# Patient Record
Sex: FEMALE | Race: WHITE | NOT HISPANIC OR LATINO | Employment: OTHER | URBAN - METROPOLITAN AREA
[De-identification: names, ages, dates, MRNs, and addresses within clinical notes are randomized per-mention and may not be internally consistent; named-entity substitution may affect disease eponyms.]

---

## 2019-03-21 ENCOUNTER — HISTORICAL (OUTPATIENT)
Dept: PREADMISSION TESTING | Facility: HOSPITAL | Age: 74
End: 2019-03-21

## 2021-01-27 ENCOUNTER — HISTORICAL (OUTPATIENT)
Dept: ADMINISTRATIVE | Facility: HOSPITAL | Age: 76
End: 2021-01-27

## 2021-05-06 ENCOUNTER — HISTORICAL (OUTPATIENT)
Dept: ADMINISTRATIVE | Facility: HOSPITAL | Age: 76
End: 2021-05-06

## 2022-03-07 ENCOUNTER — HISTORICAL (OUTPATIENT)
Dept: CARDIOLOGY | Facility: HOSPITAL | Age: 77
End: 2022-03-07

## 2022-03-29 ENCOUNTER — HISTORICAL (OUTPATIENT)
Dept: CARDIOLOGY | Facility: HOSPITAL | Age: 77
End: 2022-03-29

## 2022-03-29 LAB
INR PPP: 1.8 (ref 0–1.3)
PROTHROMBIN TIME: 20.3 S (ref 12.5–14.5)

## 2022-04-11 ENCOUNTER — HISTORICAL (OUTPATIENT)
Dept: CARDIOLOGY | Facility: HOSPITAL | Age: 77
End: 2022-04-11

## 2022-04-30 NOTE — OP NOTE
Patient:   Adrianna Mars            MRN: [id number]            FIN: [id number]               Age:   76 years     Sex:  Female     :  1945   Associated Diagnoses:   None   Author:   Major Duque MD      Preoperative diagnosis: Cataract of the  left eye     Postoperative diagnosis: Same     Operative procedure: Cataract extraction with intraocular lens implant    Lens Style: [id number]    The patient was brought to the operating theater by wheelchair and under light sedation given topical anesthesia using 0.75% Marcaine.  After adequate anesthesia the patient was then prepped and draped in usual ophthalmological manner.   Deacon lid speculums were applied and under the surgical microscope a keratome incision was made temporally using a stew keratome blade and a 15° stew keratome stab incision was made in the superior nasal quadrant.  Shugarcaine was used to enhance pupillary dilation.  Viscoat was instilled into the anterior chamber and an anterior capsulorrhexis was performed using a bent 25-gauge needle and the anterior capsule flap withdrawn with Kelman forceps. Using an irrigating Kelman cystotome the nucleus was irrigated and rocked free from the cortical bed and the handpiece was withdrawn. The phacoemulsification handpiece was introduced into the eye and phacoemulsification carried out the posterior chamber and the iris plane while a nucleus manipulator was used to direct the nucleus fragments  towards the phacoemulsification tip and prevent corneal contact. After phacoemulsification of the nucleus was completed the handpiece was withdrawn and an irrigation-aspiration handpiece was introduced into the eye and all visible cortical fragments were aspirated from the eye without difficulty. The handpiece was withdrawn and Healon was introduced into the eye to inflate the anterior chamber and the capsular bag.  An intraocular lens implant was selected, inspected, folded and placed into the  lens injector and then the lens carefully injected into the capsular bag while the haptics were positioned using the nucleus manipulator. The Healon was then aspirated from the eye using an irrigation-aspiration handpiece and the handpiece withdrawn from the eye. The anterior chamber was inflated with balanced salt solution and the wound checked for water tightness and found to be intact.  The antibiotic drops used preoperatively were instilled into the eye and the patient sent to the outpatient recovery in good condition.

## 2022-04-30 NOTE — OP NOTE
DATE OF SURGERY:    05/06/2021    SURGEON:  Ady Reed MD    PREOP DIAGNOSIS:    1. Visually significant cataract in the right eye.  2. Pupillary miosis.  3. High risk use of Flomax.    POSTOPERATIVE DIAGNOSES:    1. Visually significant cataract in the right eye.  2. Pupillary miosis.  3. High risk use of Flomax.    NAME OF PROCEDURE:    1. Phacoemulsification with implantation of posterior chamber intraocular lens, right eye.    2. Pupillary control by use of Malyugin ring.    ANESTHESIA:  Monitored anesthesia care with intracameral lidocaine.    COMPLICATIONS:  None.    BLOOD LOSS:  Nil.    PROCEDURE IN DETAIL:  After clinical examination and discussion of the risks, benefits, and alternatives, the patient was consented for cataract extraction in the right eye.  On the day of the operation, patient was brought to the operating room, and all appropriate anesthesia monitors were applied.  The eye was then prepped and draped in the usual sterile ophthalmic fashion.  Topical lidocaine was placed.  A paracentesis was created.  Intracameral lidocaine was placed.  Viscoelastic was placed into the anterior chamber.  The keratome was used to enter the eye.  A continuous curvilinear capsulorrhexis was placed into the anterior capsule using a cystome.  Hydrodissection of the lens nucleus was achieved with BSS on a cannula.  The phaco handpiece was introduced into the eye, and the cataract was removed by way of phacoemulsification.  The I and A handpiece was then introduced to the eye, and the remaining cortex was removed.  Viscoelastic was then used to reform the anterior chamber and capsular bag.  At this point, an intraocular lens model ZCB00 with a power of 24.0 diopters was removed from the package, inspected, and found to be in satisfactory condition.  This lens was then placed in the injector system and inserted into the eye.  Position was verified with the I and A handpiece.  The I and A handpiece was then  used to remove any remaining viscoelastic.     At this point, the wounds were hydrated with BSS on a cannula.  Intraocular pressure was approximated and normal.  The wounds were inspected and found to be watertight to Weck-Iraida and digital pressure.  At this point, the eye was given a final visual inspection, noted to have a well-placed PCIOL in good position within the capsular bag.  The wounds did appear to be watertight, and the intraocular pressure was approximated and normal.  At this point, the eye was liberally coated with antibiotics and shielded without being pressure patched.  The patient was then transported to the recovery room, having tolerated the procedure well.    ADDENDUM:  After entering the eye, a Malyugin ring was placed successfully to control the pupil size and allow for completion of the cataract surgery and the Malyugin ring was removed after implantation of the posterior chamber intraocular lens.  The case did proceed in the usual fashion after removal of the ring.        ______________________________  Ady Reed MD    JG/UD  DD:  05/07/2021  Time:  10:32AM  DT:  05/07/2021  Time:  10:43AM  Job #:  [id number]

## 2022-05-14 NOTE — OP NOTE
Patient:   Adrianna Mars            MRN: [id number]            FIN: [id number]               Age:   77 years     Sex:  Female     :  1945   Associated Diagnoses:   None   Author:   Moshe Ramos MD      Cardiologist: Dr. Moshe Ramos    Assistant: _    Preoperative Diagnosis(es):  [ X ] Atrial fibrillation  [ _ ] Artial flutter  [ _ ] Artial tachycardia    Postoperative Diagnosis(es):  [ X ] Normal Sinus rhythm  [ _ ] Sinus bradycardia  [ _ ] Artial fibrillation  [ _ ] Sinus with third-degree atrioventricular block  [ _ ] A-V sequential pacing/capture    Procedure(s):  Electrical cardioversion    Complications:  None    Description of Procedure:  After informed consent was obtained and permit signed, the patient was transported to the Cardiac Electrophysiology Laboratory. The EKG electrodes and [ _ ]cardioverter / [ _ ]defibrillator pads were applied to the patient. Pulse oximetry and ventilation were monitored, with oxygen and ventilation assistance given as needed. Adequate sedation for the procedure was accomplished by [X]the Anesthesia service/[_] IV Versed and Fentanyl. Synchronized direct-current energy was delivered at a level of 200 joules biphasic. Additional energy was [X] not indicated/[_] indicated at _ joules biphasic. Sinus rhythm[X]was/[_] was not restored. Upon awakening, the patient was transferred in a stable condition to a monitored bed for recovery.

## 2022-06-11 ENCOUNTER — HOSPITAL ENCOUNTER (EMERGENCY)
Facility: HOSPITAL | Age: 77
Discharge: HOME | End: 2022-06-11
Attending: EMERGENCY MEDICINE
Payer: MEDICARE

## 2022-06-11 VITALS
HEART RATE: 64 BPM | WEIGHT: 147 LBS | SYSTOLIC BLOOD PRESSURE: 141 MMHG | DIASTOLIC BLOOD PRESSURE: 72 MMHG | TEMPERATURE: 98 F | HEIGHT: 64 IN | RESPIRATION RATE: 14 BRPM | BODY MASS INDEX: 25.1 KG/M2 | OXYGEN SATURATION: 100 %

## 2022-06-11 DIAGNOSIS — R07.9 CHEST PAIN: ICD-10-CM

## 2022-06-11 DIAGNOSIS — R00.2 PALPITATIONS: Primary | ICD-10-CM

## 2022-06-11 LAB
ALBUMIN SERPL-MCNC: 3.7 GM/DL (ref 3.4–4.8)
ALBUMIN/GLOB SERPL: 1 RATIO (ref 1.1–2)
ALP SERPL-CCNC: 100 UNIT/L (ref 40–150)
ALT SERPL-CCNC: 18 UNIT/L (ref 0–55)
AST SERPL-CCNC: 26 UNIT/L (ref 5–34)
BASOPHILS # BLD AUTO: 0.01 X10(3)/MCL (ref 0–0.2)
BASOPHILS NFR BLD AUTO: 0.2 %
BILIRUBIN DIRECT+TOT PNL SERPL-MCNC: 0.7 MG/DL
BNP BLD-MCNC: 89.7 PG/ML
BUN SERPL-MCNC: 23 MG/DL (ref 9.8–20.1)
CALCIUM SERPL-MCNC: 9.4 MG/DL (ref 8.4–10.2)
CHLORIDE SERPL-SCNC: 108 MMOL/L (ref 98–107)
CO2 SERPL-SCNC: 22 MMOL/L (ref 23–31)
CREAT SERPL-MCNC: 0.87 MG/DL (ref 0.55–1.02)
EOSINOPHIL # BLD AUTO: 0.2 X10(3)/MCL (ref 0–0.9)
EOSINOPHIL NFR BLD AUTO: 3.2 %
ERYTHROCYTE [DISTWIDTH] IN BLOOD BY AUTOMATED COUNT: 15.3 % (ref 11.5–17)
GLOBULIN SER-MCNC: 3.6 GM/DL (ref 2.4–3.5)
GLUCOSE SERPL-MCNC: 110 MG/DL (ref 82–115)
HCT VFR BLD AUTO: 40.8 % (ref 37–47)
HGB BLD-MCNC: 13.6 GM/DL (ref 12–16)
LYMPHOCYTES # BLD AUTO: 1.61 X10(3)/MCL (ref 0.6–4.6)
LYMPHOCYTES NFR BLD AUTO: 26 %
MCH RBC QN AUTO: 31.4 PG (ref 27–31)
MCHC RBC AUTO-ENTMCNC: 33.3 MG/DL (ref 33–36)
MCV RBC AUTO: 94.2 FL (ref 80–94)
MONOCYTES # BLD AUTO: 0.47 X10(3)/MCL (ref 0.1–1.3)
MONOCYTES NFR BLD AUTO: 7.6 %
NEUTROPHILS # BLD AUTO: 3.9 X10(3)/MCL (ref 2.1–9.2)
NEUTROPHILS NFR BLD AUTO: 62.8 %
NRBC BLD AUTO-RTO: 0 %
PLATELET # BLD AUTO: 205 X10(3)/MCL (ref 130–400)
PMV BLD AUTO: 10.1 FL (ref 9.4–12.4)
POTASSIUM SERPL-SCNC: 4.2 MMOL/L (ref 3.5–5.1)
PROT SERPL-MCNC: 7.3 GM/DL (ref 5.8–7.6)
RBC # BLD AUTO: 4.33 X10(6)/MCL (ref 4.2–5.4)
SODIUM SERPL-SCNC: 137 MMOL/L (ref 136–145)
TROPONIN I SERPL-MCNC: <0.01 NG/ML (ref 0–0.04)
WBC # SPEC AUTO: 6.2 X10(3)/MCL (ref 4.5–11.5)

## 2022-06-11 PROCEDURE — 36000 PLACE NEEDLE IN VEIN: CPT

## 2022-06-11 PROCEDURE — 84484 ASSAY OF TROPONIN QUANT: CPT | Performed by: STUDENT IN AN ORGANIZED HEALTH CARE EDUCATION/TRAINING PROGRAM

## 2022-06-11 PROCEDURE — 93005 ELECTROCARDIOGRAM TRACING: CPT

## 2022-06-11 PROCEDURE — 80053 COMPREHEN METABOLIC PANEL: CPT | Performed by: STUDENT IN AN ORGANIZED HEALTH CARE EDUCATION/TRAINING PROGRAM

## 2022-06-11 PROCEDURE — 36415 COLL VENOUS BLD VENIPUNCTURE: CPT | Performed by: STUDENT IN AN ORGANIZED HEALTH CARE EDUCATION/TRAINING PROGRAM

## 2022-06-11 PROCEDURE — 99285 EMERGENCY DEPT VISIT HI MDM: CPT | Mod: 25

## 2022-06-11 PROCEDURE — 83880 ASSAY OF NATRIURETIC PEPTIDE: CPT | Performed by: STUDENT IN AN ORGANIZED HEALTH CARE EDUCATION/TRAINING PROGRAM

## 2022-06-11 PROCEDURE — 85025 COMPLETE CBC W/AUTO DIFF WBC: CPT | Performed by: STUDENT IN AN ORGANIZED HEALTH CARE EDUCATION/TRAINING PROGRAM

## 2022-06-11 NOTE — ED NOTES
Pt. C/o palpitations that started this morning with aching pain in chest. Reports recent cardioversionx2 for a-fib. EKG reads Normal Sinus rhythm with LBBB

## 2022-06-11 NOTE — ED PROVIDER NOTES
"Encounter Date: 6/11/2022       History     Chief Complaint   Patient presents with    Palpitations     Presents with chest burning and intermittent "fluttering" in her chest. Recent history of cardioversion x2.      HPI   76yo F presents to the ED complaining of palpitations that started this morning. Patient reports having a Hx of A. Fib with cardioversion x2 within the last 2 months. She also had recent angiogram with no blockage in 04/2022. This morning she reports feeling a "fluttering" in her chest when she woek up, was concerned because she has never felt symptoms of her A. Fib in the past. Denies n/v, SOB, diaphoresis, dizziness, lightheadedness, weakness, visual disturbances, or presyncopal/syncopal episodes. Came to the ED so that she may be evaluated.    Review of patient's allergies indicates:   Allergen Reactions    Metoprolol succinate     Penicillins     Rivaroxaban      No past medical history on file.  No past surgical history on file.  No family history on file.     Review of Systems   Constitutional: Negative for activity change, diaphoresis and fever.   HENT: Negative for tinnitus.    Eyes: Negative for visual disturbance.   Respiratory: Negative for chest tightness and shortness of breath.    Cardiovascular: Positive for palpitations. Negative for chest pain.   Gastrointestinal: Negative for abdominal pain, nausea and vomiting.   Musculoskeletal: Negative for myalgias.   Skin: Negative for pallor.   Neurological: Negative for dizziness, syncope, weakness and light-headedness.   All other systems reviewed and are negative.      Physical Exam     Initial Vitals [06/11/22 0623]   BP Pulse Resp Temp SpO2   (!) 154/94 72 18 97.5 °F (36.4 °C) 97 %      MAP       --         Physical Exam    Constitutional: She is not diaphoretic. No distress.   HENT:   Head: Normocephalic and atraumatic.   Mouth/Throat: Oropharynx is clear and moist.   Eyes: EOM are normal. Pupils are equal, round, and reactive to " light.   Neck: Neck supple. No thyromegaly present. No JVD present.   Cardiovascular: Normal rate, regular rhythm, normal heart sounds and intact distal pulses.   Pulmonary/Chest: Breath sounds normal. No respiratory distress. She exhibits no tenderness.   Abdominal: Abdomen is soft. Bowel sounds are normal. She exhibits no distension. There is no abdominal tenderness.   Musculoskeletal:      Cervical back: Neck supple.     Neurological: She is alert and oriented to person, place, and time. She has normal strength.   Skin: Skin is warm and dry.   Psychiatric: She has a normal mood and affect.         ED Course   Procedures  Labs Reviewed   COMPREHENSIVE METABOLIC PANEL - Abnormal; Notable for the following components:       Result Value    Chloride 108 (*)     Carbon Dioxide 22 (*)     Blood Urea Nitrogen 23.0 (*)     Globulin 3.6 (*)     Albumin/Globulin Ratio 1.0 (*)     All other components within normal limits   CBC WITH DIFFERENTIAL - Abnormal; Notable for the following components:    MCV 94.2 (*)     MCH 31.4 (*)     All other components within normal limits   TROPONIN I - Normal   B-TYPE NATRIURETIC PEPTIDE - Normal   CBC W/ AUTO DIFFERENTIAL    Narrative:     The following orders were created for panel order CBC auto differential.  Procedure                               Abnormality         Status                     ---------                               -----------         ------                     CBC with Differential[[id number]]        Abnormal            Final result                 Please view results for these tests on the individual orders.     EKG Readings: (Independently Interpreted)   Initial Reading: No STEMI.   Performed at 06:24, Normal sinus rhythm, Ventricular rate 68, Left axis deviation, LBBB     ECG Results          EKG 12-lead (In process)  Result time 06/11/22 06:44:53    In process by Interface, Lab In [facility] (06/11/22 06:44:53)                 Narrative:    Test Reason :  R07.9,    Vent. Rate : 068 BPM     Atrial Rate : 068 BPM     P-R Int : 204 ms          QRS Dur : 138 ms      QT Int : 426 ms       P-R-T Axes : 057 -59 111 degrees     QTc Int : 452 ms    Normal sinus rhythm  Left axis deviation  Left bundle branch block  Abnormal ECG  No previous ECGs available    Referred By:             Confirmed By:                             Imaging Results          X-Ray Chest AP Portable (Final result)  Result time 06/11/22 08:44:12    Final result by Thom Landry MD (06/11/22 08:44:12)                 Impression:      No acute chest disease is identified.      Electronically signed by: Thom Landry  Date:    06/11/2022  Time:    08:44             Narrative:    EXAMINATION:  XR CHEST AP PORTABLE    CLINICAL HISTORY:  Chest Pain;, .    COMPARISON:  March 21, 2019    FINDINGS:  No alveolar consolidation, effusion, or pneumothorax is seen.   The thoracic aorta is normal  cardiac silhouette, central pulmonary vessels and mediastinum are normal in size and are grossly unremarkable.   visualized osseous structures are grossly unremarkable.                              X-Rays:   Independently Interpreted Readings:   Chest X-Ray: CXR performed at 07:07, Airway patent, trachea midline, no obvious osseous abnormalities, cardiac silhouette not enlarged, no air under diaphragm,no costophrenic angle blunting, no opacifications noted, No acute cardiopulmonary process     Medications - No data to display  Medical Decision Making:   Initial Assessment:   78yo F with Hx of A. Fib and cardioversion x2 on Eliquis presents to the ED complaining of palpitations that began this morning. She denied any CP, SOB, diaphoresis, n/v, or weakness.   Differential Diagnosis:   A. Fib, Anemia, ACS  ED Management:  All labs within normal limits, EKG and cardiac monitor while in ED did not show A. Fib. Patient feels well and reports palpitations are not occurring as often and she feels like she would like to go  home. She reports having an appointment with cardiology on Wednesday 06/15/2022. Stable for discharge.            Attending Attestation:   Physician Attestation Statement for Resident:  As the supervising MD   Physician Attestation Statement: I have personally seen and examined this patient.    As the supervising MD I agree with the above PE.    As the supervising MD I agree with the above treatment, course, plan, and disposition.   -:     Attending Attestation:   Physician Attestation Statement for Resident:  As the supervising MD   Physician Attestation Statement: I have personally seen and examined this patient.   I agree with the above history. -:   As the supervising MD I agree with the above PE.    As the supervising MD I agree with the above treatment, course, plan, and disposition.   -: The resident and I discussed the patient's presenting complaints along with physical exam.  Differential diagnosis includes PVCs, PACs, intermittent AFib, atrial flutter, ventricular tachydysrhythmias, palpitations.  ECG shows the patient has sinus rhythm.  Will be observed in the emergency department on telemetry monitoring while awaiting routine laboratory tests to rule out anemia, electrolyte abnormalities as well.  Patient is comfortable without any vital sign instability.  Never with any complaints of chest pain or shortness of breath.  Plan will be discharge patient back to follow-up with her primary care physician/cardiologist if continues with no evidence of emergent dysrhythmias, lab abnormalities.  I have reviewed and agree with the residents interpretation of the following: lab data and EKG.              I have reviewed and agree with the residents interpretation of the following: lab data and EKG.                         Clinical Impression:   Final diagnoses:  [R07.9] Chest pain  [R00.2] Palpitations (Primary)                 Lawanda Burton MD  Resident  06/11/22 0908

## 2022-06-14 PROBLEM — E78.5 HLD (HYPERLIPIDEMIA): Chronic | Status: ACTIVE | Noted: 2022-06-14

## 2022-06-14 PROBLEM — I48.91 A-FIB: Status: ACTIVE | Noted: 2022-06-14

## 2022-06-14 PROBLEM — I50.32 CHRONIC HEART FAILURE WITH PRESERVED EJECTION FRACTION: Status: ACTIVE | Noted: 2022-06-14

## 2022-06-14 PROBLEM — I63.20 CEREBROVASCULAR ACCIDENT (CVA) DUE TO STENOSIS OF PRECEREBRAL ARTERY: Status: RESOLVED | Noted: 2022-06-14 | Resolved: 2022-06-14

## 2022-06-14 PROBLEM — H25.13 AGE-RELATED NUCLEAR CATARACT OF BOTH EYES: Status: ACTIVE | Noted: 2022-06-14

## 2022-06-14 PROBLEM — I10 HYPERTENSION: Chronic | Status: ACTIVE | Noted: 2022-06-14

## 2022-06-14 PROBLEM — I63.9 ISCHEMIC CEREBROVASCULAR ACCIDENT (CVA): Chronic | Status: ACTIVE | Noted: 2022-06-14

## 2022-06-14 PROBLEM — M19.90 ARTHRITIS: Status: ACTIVE | Noted: 2022-06-14

## 2022-06-14 PROBLEM — G47.33 OSA ON CPAP: Status: ACTIVE | Noted: 2022-03-15

## 2022-06-14 PROBLEM — R73.01 IFG (IMPAIRED FASTING GLUCOSE): Chronic | Status: ACTIVE | Noted: 2022-03-15

## 2022-06-14 PROBLEM — R73.01 IFG (IMPAIRED FASTING GLUCOSE): Status: ACTIVE | Noted: 2022-03-15

## 2022-06-14 PROBLEM — I63.20 CEREBROVASCULAR ACCIDENT (CVA) DUE TO STENOSIS OF PRECEREBRAL ARTERY: Status: ACTIVE | Noted: 2022-06-14

## 2022-06-14 PROBLEM — K57.90 DIVERTICULOSIS: Status: ACTIVE | Noted: 2022-06-14

## 2022-06-14 PROBLEM — I63.9 ISCHEMIC CEREBROVASCULAR ACCIDENT (CVA): Status: ACTIVE | Noted: 2022-06-14

## 2022-06-14 PROBLEM — M19.90 ARTHRITIS: Chronic | Status: ACTIVE | Noted: 2022-06-14

## 2022-06-14 PROBLEM — H25.13 AGE-RELATED NUCLEAR CATARACT OF BOTH EYES: Chronic | Status: ACTIVE | Noted: 2022-06-14

## 2022-06-14 PROBLEM — I50.32 CHRONIC HEART FAILURE WITH PRESERVED EJECTION FRACTION: Chronic | Status: ACTIVE | Noted: 2022-06-14

## 2022-06-14 PROBLEM — E78.5 HLD (HYPERLIPIDEMIA): Status: ACTIVE | Noted: 2022-06-14

## 2022-06-14 PROBLEM — G47.33 OSA ON CPAP: Chronic | Status: ACTIVE | Noted: 2022-03-15

## 2022-06-14 PROBLEM — C64.2: Chronic | Status: ACTIVE | Noted: 2022-03-15

## 2022-06-14 PROBLEM — H90.3 BILATERAL SENSORINEURAL HEARING LOSS: Chronic | Status: ACTIVE | Noted: 2022-06-14

## 2022-06-14 PROBLEM — I10 HYPERTENSION: Status: ACTIVE | Noted: 2022-06-14

## 2022-06-14 PROBLEM — C64.2: Status: ACTIVE | Noted: 2022-03-15

## 2022-06-14 PROBLEM — H90.3 BILATERAL SENSORINEURAL HEARING LOSS: Status: ACTIVE | Noted: 2022-06-14

## 2022-06-14 PROBLEM — R73.01 IFG (IMPAIRED FASTING GLUCOSE): Chronic | Status: RESOLVED | Noted: 2022-03-15 | Resolved: 2022-06-14

## 2022-06-14 PROBLEM — K57.90 DIVERTICULOSIS: Chronic | Status: ACTIVE | Noted: 2022-06-14

## 2022-06-14 PROBLEM — I48.91 A-FIB: Chronic | Status: ACTIVE | Noted: 2022-06-14

## 2022-07-28 ENCOUNTER — HOSPITAL ENCOUNTER (EMERGENCY)
Facility: HOSPITAL | Age: 77
Discharge: HOME | End: 2022-07-29
Attending: EMERGENCY MEDICINE
Payer: MEDICARE

## 2022-07-28 DIAGNOSIS — M62.838 MUSCLE SPASMS OF NECK: Primary | ICD-10-CM

## 2022-07-28 DIAGNOSIS — R53.1 WEAKNESS: ICD-10-CM

## 2022-07-28 PROCEDURE — 99284 EMERGENCY DEPT VISIT MOD MDM: CPT | Mod: 25

## 2022-07-28 PROCEDURE — 93005 ELECTROCARDIOGRAM TRACING: CPT

## 2022-07-28 PROCEDURE — 93010 EKG 12-LEAD: ICD-10-PCS | Mod: ,,, | Performed by: INTERNAL MEDICINE

## 2022-07-28 PROCEDURE — 93010 ELECTROCARDIOGRAM REPORT: CPT | Mod: ,,, | Performed by: INTERNAL MEDICINE

## 2022-07-29 ENCOUNTER — OFFICE VISIT (OUTPATIENT)
Dept: URGENT CARE | Facility: CLINIC | Age: 77
End: 2022-07-29
Payer: MEDICARE

## 2022-07-29 VITALS
HEIGHT: 64 IN | RESPIRATION RATE: 16 BRPM | DIASTOLIC BLOOD PRESSURE: 98 MMHG | TEMPERATURE: 99 F | HEART RATE: 62 BPM | BODY MASS INDEX: 25.95 KG/M2 | SYSTOLIC BLOOD PRESSURE: 139 MMHG | WEIGHT: 152 LBS | OXYGEN SATURATION: 96 %

## 2022-07-29 VITALS
OXYGEN SATURATION: 98 % | BODY MASS INDEX: 25.44 KG/M2 | WEIGHT: 149 LBS | SYSTOLIC BLOOD PRESSURE: 150 MMHG | TEMPERATURE: 98 F | RESPIRATION RATE: 20 BRPM | HEIGHT: 64 IN | DIASTOLIC BLOOD PRESSURE: 84 MMHG | HEART RATE: 60 BPM

## 2022-07-29 DIAGNOSIS — I80.9 PHLEBITIS AFTER INFUSION, INITIAL ENCOUNTER: ICD-10-CM

## 2022-07-29 DIAGNOSIS — T80.1XXA PHLEBITIS AFTER INFUSION, INITIAL ENCOUNTER: ICD-10-CM

## 2022-07-29 PROCEDURE — 1159F MED LIST DOCD IN RCRD: CPT | Mod: CPTII,,, | Performed by: NURSE PRACTITIONER

## 2022-07-29 PROCEDURE — 99202 OFFICE O/P NEW SF 15 MIN: CPT | Mod: ,,, | Performed by: NURSE PRACTITIONER

## 2022-07-29 PROCEDURE — 3079F DIAST BP 80-89 MM HG: CPT | Mod: CPTII,,, | Performed by: NURSE PRACTITIONER

## 2022-07-29 PROCEDURE — 3077F PR MOST RECENT SYSTOLIC BLOOD PRESSURE >= 140 MM HG: ICD-10-PCS | Mod: CPTII,,, | Performed by: NURSE PRACTITIONER

## 2022-07-29 PROCEDURE — 3079F PR MOST RECENT DIASTOLIC BLOOD PRESSURE 80-89 MM HG: ICD-10-PCS | Mod: CPTII,,, | Performed by: NURSE PRACTITIONER

## 2022-07-29 PROCEDURE — 3077F SYST BP >= 140 MM HG: CPT | Mod: CPTII,,, | Performed by: NURSE PRACTITIONER

## 2022-07-29 PROCEDURE — 96374 THER/PROPH/DIAG INJ IV PUSH: CPT

## 2022-07-29 PROCEDURE — 96375 TX/PRO/DX INJ NEW DRUG ADDON: CPT

## 2022-07-29 PROCEDURE — 1159F PR MEDICATION LIST DOCUMENTED IN MEDICAL RECORD: ICD-10-PCS | Mod: CPTII,,, | Performed by: NURSE PRACTITIONER

## 2022-07-29 PROCEDURE — 99202 PR OFFICE/OUTPT VISIT, NEW, LEVL II, 15-29 MIN: ICD-10-PCS | Mod: ,,, | Performed by: NURSE PRACTITIONER

## 2022-07-29 PROCEDURE — 63600175 PHARM REV CODE 636 W HCPCS: Performed by: EMERGENCY MEDICINE

## 2022-07-29 RX ORDER — ESOMEPRAZOLE MAGNESIUM 40 MG/1
40 CAPSULE, DELAYED RELEASE ORAL
COMMUNITY

## 2022-07-29 RX ORDER — DROPERIDOL 2.5 MG/ML
1.25 INJECTION, SOLUTION INTRAMUSCULAR; INTRAVENOUS EVERY 6 HOURS PRN
Status: DISCONTINUED | OUTPATIENT
Start: 2022-07-29 | End: 2022-07-29 | Stop reason: HOSPADM

## 2022-07-29 RX ORDER — METHYLPREDNISOLONE 4 MG/1
TABLET ORAL
Qty: 21 EACH | Refills: 0 | Status: SHIPPED | OUTPATIENT
Start: 2022-07-29 | End: 2022-08-17 | Stop reason: ALTCHOICE

## 2022-07-29 RX ORDER — ATORVASTATIN CALCIUM 40 MG/1
40 TABLET, FILM COATED ORAL DAILY
COMMUNITY
Start: 2022-06-22

## 2022-07-29 RX ORDER — SOTALOL HYDROCHLORIDE 120 MG/1
80 TABLET ORAL 2 TIMES DAILY
COMMUNITY
Start: 2022-06-27

## 2022-07-29 RX ORDER — APIXABAN 5 MG/1
5 TABLET, FILM COATED ORAL 2 TIMES DAILY
COMMUNITY
Start: 2022-07-12

## 2022-07-29 RX ORDER — EPINEPHRINE 0.22MG
200 AEROSOL WITH ADAPTER (ML) INHALATION DAILY
COMMUNITY

## 2022-07-29 RX ORDER — METHOCARBAMOL 500 MG/1
1000 TABLET, FILM COATED ORAL 3 TIMES DAILY
Qty: 30 TABLET | Refills: 0 | Status: SHIPPED | OUTPATIENT
Start: 2022-07-29 | End: 2022-08-03

## 2022-07-29 RX ORDER — DIPHENHYDRAMINE HYDROCHLORIDE 50 MG/ML
25 INJECTION INTRAMUSCULAR; INTRAVENOUS
Status: COMPLETED | OUTPATIENT
Start: 2022-07-29 | End: 2022-07-29

## 2022-07-29 RX ORDER — TAMSULOSIN HYDROCHLORIDE 0.4 MG/1
1 CAPSULE ORAL DAILY
COMMUNITY
Start: 2022-05-24

## 2022-07-29 RX ADMIN — DIPHENHYDRAMINE HYDROCHLORIDE 25 MG: 50 INJECTION, SOLUTION INTRAMUSCULAR; INTRAVENOUS at 12:07

## 2022-07-29 RX ADMIN — DROPERIDOL 1.25 MG: 2.5 INJECTION, SOLUTION INTRAMUSCULAR; INTRAVENOUS at 12:07

## 2022-07-29 NOTE — ED NOTES
Pt d/c'd home w/ , verbalized understanding of d/c instructions and meds prescribed, NAD noted, pt ambulated to front lobby w/ steady gait.

## 2022-07-29 NOTE — DISCHARGE INSTRUCTIONS
Advice after your visit:      Your visit in the emergency department is NOT definitive care - please follow-up with your primary care doctor and/or specialist within 1-2 days.  Please return if you have any worsening in your condition or if you have any other concerns.    If you had radiology exams like an XRAY or CT in the emergency Department the interpreation on them may be preliminary - there may be less time sensitive findings on the reports please obtain these reports within 24 hours from the hospital or by using your out on your mobile phone to access records.  Bring these to your primary care doctor and/or specialist for further review of incidental findings.    Please review any LAB WORK from your visit today with your primary care physician.    If you were prescribed OPIATE PAIN MEDICATION - please understand of these medications can be addictive, you may fill less of the prescription was written for, you do not have to take the full prescription.  You may discard what you do not use.  Please seek help if you feel you are having problems with addiction.  Do not drive or operate heavy machinery if you are taking sedating medications.  Do not mix these medications with alcohol.      If you had a SPLINT placed in the emergency department if you have severe pain numbness tingling or discoloration of year digits please remove the splint and return to the emergency department for further evaluation as this may represent a sign of compromise to the nerves or blood vessels due to swelling.    If you had SUTURES in the emergency department please have them removed in the prescribed time frame typically within 7-14 days.  You may shower but please do not bathe or swim.  Keep the wounds clean and dry and covered with a clean dressing.  Please return if he have any signs of infection like redness or drainage or pain at the suture site.    Please take the full course of  any ANTIBIOTICS you were prescribed - incomplete  courses of antibiotics can cause resistance to antibiotics in the future which will make it difficult to treat any infections you may have.

## 2022-07-29 NOTE — ED PROVIDER NOTES
Encounter Date: 7/28/2022    SCRIBE #1 NOTE: I, Ginna Arrington, am scribing for, and in the presence of,  Gigi Stephens MD. I have scribed the following portions of the note - Other sections scribed: HPI, ROS, PE.       History     Chief Complaint   Patient presents with    Neck Pain     Pt reports that she started having right sided neck pain at the base of her skull , that pain is now bilateral. Pain waxes and wanes. Pt reports that the pain got worse when laying down.  Pt had  a similar symptoms with reaction to xarelto, pt no longer takes xarelto. Pt has a hx of previous stroke (TPA'd) and afib.  No facial asymmetry , strength is equal bilaterally, pt is AAOx4.     HPI  Review of patient's allergies indicates:   Allergen Reactions    Metoprolol succinate     Penicillins     Rivaroxaban      Past Medical History:   Diagnosis Date    A-fib 6/14/2022    Age-related nuclear cataract of both eyes 6/14/2022    Arthritis 6/14/2022    Bilateral sensorineural hearing loss 6/14/2022    Cerebrovascular accident (CVA) due to stenosis of precerebral artery 6/14/2022    Chronic heart failure with preserved ejection fraction 6/14/2022    Diverticulosis 6/14/2022    HLD (hyperlipidemia) 6/14/2022    Hypertension 6/14/2022    Ischemic cerebrovascular accident (CVA) 6/14/2022    Kidney carcinoma, left 3/15/2022    CADY on CPAP 3/15/2022     No past surgical history on file.  No family history on file.     Review of Systems    Physical Exam     Initial Vitals [07/28/22 2258]   BP Pulse Resp Temp SpO2   (!) 168/92 66 18 99.3 °F (37.4 °C) 96 %      MAP       --         Physical Exam    Nursing note and vitals reviewed.  Constitutional: She appears well-developed and well-nourished. No distress.   HENT:   Head: Normocephalic and atraumatic.   Mouth/Throat: Oropharynx is clear and moist.   Eyes: Conjunctivae are normal. Pupils are equal, round, and reactive to light.   Neck: Neck supple.   Normal range of  motion.  Cardiovascular: Normal rate, regular rhythm and normal heart sounds.   No murmur heard.  Pulmonary/Chest: Breath sounds normal. No respiratory distress.   Abdominal: Abdomen is soft. She exhibits no distension. There is no abdominal tenderness.   Musculoskeletal:         General: Normal range of motion.      Cervical back: Normal range of motion and neck supple.     Neurological: She is alert and oriented to person, place, and time. GCS score is 15. GCS eye subscore is 4. GCS verbal subscore is 5. GCS motor subscore is 6.   Skin: Skin is warm and dry. No rash noted.   Psychiatric: Her mood appears anxious.         ED Course   Procedures  Labs Reviewed - No data to display       Imaging Results    None          Medications   droperidoL injection 1.25 mg (1.25 mg Intravenous Given 7/29/22 0054)   diphenhydrAMINE injection 25 mg (25 mg Intravenous Given 7/29/22 0045)              Scribe Attestation:   Scribe #1: I performed the above scribed service and the documentation accurately describes the services I performed. I attest to the accuracy of the note.    Attending Attestation:           Physician Attestation for Scribe:  Physician Attestation Statement for Scribe #1: I, Gigi Stephens MD, reviewed documentation, as scribed by Ginna Arrington in my presence, and it is both accurate and complete.                      Clinical Impression:   Final diagnoses:  [R53.1] Weakness  [M62.838] Muscle spasms of neck (Primary)          ED Disposition Condition    Discharge Stable        ED Prescriptions     Medication Sig Dispense Start Date End Date Auth. Provider    methylPREDNISolone (MEDROL DOSEPACK) 4 mg tablet use as directed 21 each 7/29/2022 8/19/2022 Gigi Stephens MD    methocarbamoL (ROBAXIN) 500 MG Tab Take 2 tablets (1,000 mg total) by mouth 3 (three) times daily. for 5 days 30 tablet 7/29/2022 8/3/2022 Gigi Stephens MD        Follow-up Information     Follow up With Specialties Details Why  Contact Info    Norm Lucio MD Internal Medicine   [address]  [facility] [address]  [phone]             Gigi Stephens MD  07/29/22 0738

## 2022-07-29 NOTE — PROGRESS NOTES
"Subjective:       Patient ID: Adrianna Mars is a 77 y.o. female.    Vitals:  height is 5' 4" (1.626 m) and weight is 67.6 kg (149 lb). Her oral temperature is 98 °F (36.7 °C). Her blood pressure is 150/84 (abnormal) and her pulse is 60. Her respiration is 20 and oxygen saturation is 98%.     Chief Complaint: Follow-up (In ED last night, IV site bleeding overnight. Pt concerned due to being on Eliquis. )    77-year-old female presents with dried blood underneath Tegaderm dressing from an IV site several hours ago from the ER patient currently on Eliquis concerned about possible phlebitis    Follow-up        Skin: Positive for bruising (after skin cleansed no swelling or bruising). Negative for erythema.       Objective:      Physical Exam   Constitutional: She is oriented to person, place, and time. She appears well-developed. She is cooperative.  Non-toxic appearance. She does not appear ill. No distress.   HENT:   Head: Normocephalic and atraumatic.   Ears:   Right Ear: Hearing, tympanic membrane, external ear and ear canal normal.   Left Ear: Hearing, tympanic membrane, external ear and ear canal normal.   Nose: Nose normal. No mucosal edema, rhinorrhea or nasal deformity. No epistaxis. Right sinus exhibits no maxillary sinus tenderness and no frontal sinus tenderness. Left sinus exhibits no maxillary sinus tenderness and no frontal sinus tenderness.   Mouth/Throat: Uvula is midline, oropharynx is clear and moist and mucous membranes are normal. No trismus in the jaw. Normal dentition. No uvula swelling. No posterior oropharyngeal erythema.   Eyes: Conjunctivae and lids are normal. Right eye exhibits no discharge. Left eye exhibits no discharge. No scleral icterus.   Neck: Trachea normal and phonation normal. Neck supple.   Cardiovascular: Normal rate, regular rhythm, normal heart sounds and normal pulses.   Pulmonary/Chest: Effort normal and breath sounds normal. No respiratory distress.   Abdominal: Normal " appearance and bowel sounds are normal. She exhibits no distension and no mass. Soft. There is no abdominal tenderness.   Musculoskeletal: Normal range of motion.         General: No deformity. Normal range of motion.   Neurological: She is alert and oriented to person, place, and time. She exhibits normal muscle tone. Coordination normal.   Skin: Skin is warm, dry, intact, not diaphoretic and not pale. bruising (no swelling to left forearm) No erythema   Psychiatric: Her speech is normal and behavior is normal. Judgment and thought content normal.   Nursing note and vitals reviewed.        Assessment:       1. Phlebitis after infusion, initial encounter          Plan:     apply coolness to IV site if swelling noted.  Follow-up with your primary care provider or return here for concerns    Phlebitis after infusion, initial encounter

## 2022-07-29 NOTE — PATIENT INSTRUCTIONS
Apply coolness to IV site as needed for swelling or bruising  Return here or follow-up with your primary care provider for concerns

## 2022-08-05 PROBLEM — T80.1XXA PHLEBITIS AFTER INFUSION: Status: RESOLVED | Noted: 2022-07-29 | Resolved: 2022-08-05

## 2022-08-05 PROBLEM — I80.9 PHLEBITIS AFTER INFUSION: Status: RESOLVED | Noted: 2022-07-29 | Resolved: 2022-08-05

## 2022-08-05 PROBLEM — M54.2 CERVICALGIA: Status: ACTIVE | Noted: 2022-08-05

## 2022-09-14 NOTE — PROCEDURES
Patient:   Adrianna Mars            MRN: [id number]            FIN: [id number]               Age:   77 years     Sex:  Female     :  1945   Associated Diagnoses:   None   Author:   Moshe Ramos MD      Cardiologist: Dr. Moshe Ramos    Assistant: _    Preoperative Diagnosis(es):  [ X ] Atrial fibrillation  [ _ ] Artial flutter  [ _ ] Artial tachycardia    Postoperative Diagnosis(es):  [ X ] Normal Sinus rhythm  [ _ ] Sinus bradycardia  [ _ ] Artial fibrillation  [ _ ] Sinus with third-degree atrioventricular block  [ _ ] A-V sequential pacing/capture    Procedure(s):  Electrical cardioversion    Complications:  None    Description of Procedure:  After informed consent was obtained and permit signed, the patient was transported to the Cardiac Electrophysiology Laboratory. The EKG electrodes and [ _ ]cardioverter / [ _ ]defibrillator pads were applied to the patient. Pulse oximetry and ventilation were monitored, with oxygen and ventilation assistance given as needed. Adequate sedation for the procedure was accomplished by [X]the Anesthesia service/[_] IV Versed and Fentanyl. Synchronized direct-current energy was delivered at a level of 200 joules biphasic. Additional energy was [X] not indicated/[_] indicated at _ joules biphasic. Sinus rhythm[X]was/[_] was not restored. Upon awakening, the patient was transferred in a stable condition to a monitored bed for recovery.

## 2022-09-26 ENCOUNTER — APPOINTMENT (OUTPATIENT)
Dept: RADIOLOGY | Facility: HOSPITAL | Age: 77
End: 2022-09-26
Attending: INTERNAL MEDICINE
Payer: MEDICARE

## 2022-09-26 DIAGNOSIS — M54.2 CERVICALGIA: ICD-10-CM

## 2022-09-26 PROCEDURE — 72141 MRI NECK SPINE W/O DYE: CPT | Mod: TC

## 2023-01-31 PROBLEM — M54.2 CERVICALGIA: Chronic | Status: ACTIVE | Noted: 2022-08-05

## 2023-04-11 ENCOUNTER — TELEPHONE (OUTPATIENT)
Dept: NEUROSURGERY | Facility: CLINIC | Age: 78
End: 2023-04-11
Payer: MEDICARE

## 2023-04-11 NOTE — TELEPHONE ENCOUNTER
----- Message from Kathleen Ferreira PA-C sent at 4/11/2023  2:51 PM CDT -----  Regarding: needs appointment  This is Aneta Torres's mother.  Ami contacted me asking we see her.  Please have her come in to see me relatively soon.  Add her on to a day that is not bad.  She needs cervical x-rays with flex/ext views.  The x-rays sent over from [facility] are lumbar.  Maybe check to see if they have cervical.

## 2023-04-14 ENCOUNTER — TELEPHONE (OUTPATIENT)
Dept: NEUROSURGERY | Facility: CLINIC | Age: 78
End: 2023-04-14
Payer: MEDICARE

## 2023-04-14 NOTE — TELEPHONE ENCOUNTER
----- Message from Laurence Olmedo MA sent at 4/11/2023  4:37 PM CDT -----  Regarding: NEEDS APPT  [facility] Neurosurgery Clinical Support Staff  This is Aneta Torres's mother.  Ami contacted me asking we see her.  Please have her come in to see me relatively soon.  Add her on to a day that is not bad.  She needs cervical x-rays with flex/ext views.  The x-rays sent over from [facility] are lumbar.  Maybe check to see if they have cervical.

## 2023-04-21 ENCOUNTER — TELEPHONE (OUTPATIENT)
Dept: NEUROSURGERY | Facility: CLINIC | Age: 78
End: 2023-04-21
Payer: MEDICARE

## 2023-04-21 NOTE — TELEPHONE ENCOUNTER
Setting reminder to myself to call patient in regards to Kathleen's recommendation and advice on 04/24 around 9:00am when clinic resumes.

## 2023-04-21 NOTE — TELEPHONE ENCOUNTER
Her main complaints are of HA (temples) and LBP.    Early last year, she experienced an adverse reaction to a medication.  She started with HA at that time.  The HA can get as bad as 8-9/10.  She was told the HA is coming from her neck.      She also complains of LBP (6-7/10 at worse) that is likely mechanical in nature.  She does not have leg symptoms through the day.  She reports restless leg symptoms at night.  I reviewed her cervical MRI and lumbar x-rays.  We need cervical x-rays, lumbar flex/ext, and lumbar MRI.  She wants to go to [facility] [place] [place] imaging.  Have her come in to see me on Thursday May 4.

## 2023-04-24 ENCOUNTER — TELEPHONE (OUTPATIENT)
Dept: NEUROSURGERY | Facility: CLINIC | Age: 78
End: 2023-04-24
Payer: MEDICARE

## 2023-04-24 DIAGNOSIS — M54.2 CERVICALGIA: Chronic | ICD-10-CM

## 2023-04-24 DIAGNOSIS — M54.16 LUMBAR RADICULOPATHY: Primary | ICD-10-CM

## 2023-04-24 NOTE — TELEPHONE ENCOUNTER
No, it's fine, we don't need one. You can just schedule her per Kathleen as a NP on the date she asked with the XR.

## 2023-04-24 NOTE — TELEPHONE ENCOUNTER
I opened up the 05/04 slot at 1:00. Can you please schedule this patient as Kathleen suggested? She will be a new patient.

## 2023-04-24 NOTE — TELEPHONE ENCOUNTER
----- Message from Laurence Olmedo MA sent at 4/21/2023  1:00 PM CDT -----  Regarding: NEEDS APPT  Her main complaints are of HA (temples) and LBP.    Early last year, she experienced an adverse reaction to a medication.  She started with HA at that time.  The HA can get as bad as 8-9/10.  She was told the HA is coming from her neck.       She also complains of LBP (6-7/10 at worse) that is likely mechanical in nature.  She does not have leg symptoms through the day.  She reports restless leg symptoms at night.  I reviewed her cervical MRI and lumbar x-rays.  We need cervical x-rays, lumbar flex/ext, and lumbar MRI.  She [place] [facility] [place].  Have her come in to see me on Thursday May 4.

## 2023-04-24 NOTE — TELEPHONE ENCOUNTER
Per tyron, we will not have time to get MRI authorized so proceed with xrays. I called patient, advised of Kathleen's recommendations. Scheduled her for 5/4/23 at 1:00. I did advise patient that I faxed orders for xray to [facility] imaging and they will be giving her a call to schedule. She verbalized understanding.

## 2023-05-04 ENCOUNTER — OFFICE VISIT (OUTPATIENT)
Dept: NEUROSURGERY | Facility: CLINIC | Age: 78
End: 2023-05-04
Payer: MEDICARE

## 2023-05-04 VITALS
WEIGHT: 161 LBS | HEART RATE: 60 BPM | HEIGHT: 64 IN | DIASTOLIC BLOOD PRESSURE: 94 MMHG | RESPIRATION RATE: 16 BRPM | SYSTOLIC BLOOD PRESSURE: 166 MMHG | BODY MASS INDEX: 27.49 KG/M2

## 2023-05-04 DIAGNOSIS — M41.57 SCOLIOSIS OF LUMBOSACRAL REGION DUE TO DEGENERATIVE DISEASE OF SPINE IN ADULT: ICD-10-CM

## 2023-05-04 DIAGNOSIS — M51.34 DDD (DEGENERATIVE DISC DISEASE), THORACIC: ICD-10-CM

## 2023-05-04 DIAGNOSIS — M47.22 CERVICAL SPONDYLOSIS WITH RADICULOPATHY: Primary | ICD-10-CM

## 2023-05-04 DIAGNOSIS — M51.36 LUMBAR DEGENERATIVE DISC DISEASE: ICD-10-CM

## 2023-05-04 DIAGNOSIS — R51.9 NONINTRACTABLE HEADACHE, UNSPECIFIED CHRONICITY PATTERN, UNSPECIFIED HEADACHE TYPE: ICD-10-CM

## 2023-05-04 PROCEDURE — 1101F PT FALLS ASSESS-DOCD LE1/YR: CPT | Mod: CPTII,,, | Performed by: PHYSICIAN ASSISTANT

## 2023-05-04 PROCEDURE — 1160F RVW MEDS BY RX/DR IN RCRD: CPT | Mod: CPTII,,, | Performed by: PHYSICIAN ASSISTANT

## 2023-05-04 PROCEDURE — 3288F FALL RISK ASSESSMENT DOCD: CPT | Mod: CPTII,,, | Performed by: PHYSICIAN ASSISTANT

## 2023-05-04 PROCEDURE — 1126F PR PAIN SEVERITY QUANTIFIED, NO PAIN PRESENT: ICD-10-PCS | Mod: CPTII,,, | Performed by: PHYSICIAN ASSISTANT

## 2023-05-04 PROCEDURE — 3080F DIAST BP >= 90 MM HG: CPT | Mod: CPTII,,, | Performed by: PHYSICIAN ASSISTANT

## 2023-05-04 PROCEDURE — 99204 OFFICE O/P NEW MOD 45 MIN: CPT | Mod: ,,, | Performed by: PHYSICIAN ASSISTANT

## 2023-05-04 PROCEDURE — 3288F PR FALLS RISK ASSESSMENT DOCUMENTED: ICD-10-PCS | Mod: CPTII,,, | Performed by: PHYSICIAN ASSISTANT

## 2023-05-04 PROCEDURE — 1126F AMNT PAIN NOTED NONE PRSNT: CPT | Mod: CPTII,,, | Performed by: PHYSICIAN ASSISTANT

## 2023-05-04 PROCEDURE — 1159F MED LIST DOCD IN RCRD: CPT | Mod: CPTII,,, | Performed by: PHYSICIAN ASSISTANT

## 2023-05-04 PROCEDURE — 1160F PR REVIEW ALL MEDS BY PRESCRIBER/CLIN PHARMACIST DOCUMENTED: ICD-10-PCS | Mod: CPTII,,, | Performed by: PHYSICIAN ASSISTANT

## 2023-05-04 PROCEDURE — 99204 PR OFFICE/OUTPT VISIT, NEW, LEVL IV, 45-59 MIN: ICD-10-PCS | Mod: ,,, | Performed by: PHYSICIAN ASSISTANT

## 2023-05-04 PROCEDURE — 1101F PR PT FALLS ASSESS DOC 0-1 FALLS W/OUT INJ PAST YR: ICD-10-PCS | Mod: CPTII,,, | Performed by: PHYSICIAN ASSISTANT

## 2023-05-04 PROCEDURE — 3080F PR MOST RECENT DIASTOLIC BLOOD PRESSURE >= 90 MM HG: ICD-10-PCS | Mod: CPTII,,, | Performed by: PHYSICIAN ASSISTANT

## 2023-05-04 PROCEDURE — 3077F SYST BP >= 140 MM HG: CPT | Mod: CPTII,,, | Performed by: PHYSICIAN ASSISTANT

## 2023-05-04 PROCEDURE — 3077F PR MOST RECENT SYSTOLIC BLOOD PRESSURE >= 140 MM HG: ICD-10-PCS | Mod: CPTII,,, | Performed by: PHYSICIAN ASSISTANT

## 2023-05-04 PROCEDURE — 1159F PR MEDICATION LIST DOCUMENTED IN MEDICAL RECORD: ICD-10-PCS | Mod: CPTII,,, | Performed by: PHYSICIAN ASSISTANT

## 2023-05-04 RX ORDER — OMEGA-3-ACID ETHYL ESTERS 1 G/1
2 CAPSULE, LIQUID FILLED ORAL DAILY
COMMUNITY
End: 2023-08-17

## 2023-05-04 NOTE — PROGRESS NOTES
[facility]  History & Physical  Neurosurgery      Adrianna Mars   [id number]   1945       SUBJECTIVE:     CHIEF COMPLAINT:  Headaches, mid and lower back pain      HPI:  Adrianna Mars is a 78 y.o. female who presents for neurosurgical evaluation.  The patient began with headaches, neck pain, and pain into the trapezius muscles 1.5 years ago.  Around the same time, she was switched to Xarelto.  Currently, the headaches occur 2 to 3 times a month.  The duration is 1-3 days.  She also complains of neck pain with pain into the interscapular region, midthoracic region and lower back.  The pain is mechanical in nature.  With sitting after activity, she feels fatigued at the right anterior and lateral thigh.  She describes burning at bilateral ankles.  She denies arm or leg pain, numbness, or tingling.  Subjectively she has weakness in her arms.  She does have a history of shoulder surgery bilaterally and right hip replacement.  Activity increases her pain.  Sitting and resting helps reduce her pain.      Her main complaint is of mid and lower back pain.  The pain occurs every day.  Her headaches are a problem when they occur.  Conservative measures have included chiropractic care for her neck in the past.  She did not like that treatment.  She is receiving massage therapy now which has provided short-term relief.  She describes urinary leakage and constipation.  She feels her balance is good.      Past Medical History:   Diagnosis Date    A-fib 06/14/2022    Age-related nuclear cataract of both eyes 06/14/2022    Arthritis 06/14/2022    Bilateral sensorineural hearing loss 06/14/2022    Cerebrovascular accident (CVA) due to stenosis of precerebral artery 06/14/2022    Cervicalgia 08/05/2022    Chronic heart failure with preserved ejection fraction 06/14/2022    Diverticulosis 06/14/2022    HLD (hyperlipidemia) 06/14/2022    Hypertension 06/14/2022    Ischemic cerebrovascular accident (CVA)  "06/14/2022    Kidney carcinoma, left 03/15/2022    Lumbar radiculopathy     CADY on CPAP 03/15/2022       Past Surgical History:   Procedure Laterality Date    APPENDECTOMY      CARDIOVERSION      HIP REPLACEMENT ARTHROPLASTY Right 2017    HYSTERECTOMY      KIDNEY SURGERY Left 2015    excision of tumor.    SHOULDER SURGERY Bilateral 2013       Family History   Problem Relation Age of Onset    Stroke Mother     Prostate cancer Father     Cancer Brother        Social History     Socioeconomic History    Marital status:    Tobacco Use    Smoking status: Never    Smokeless tobacco: Never        Review of patient's allergies indicates:   Allergen Reactions    Metoprolol succinate     Penicillins     Rivaroxaban         Current Outpatient Medications   Medication Instructions    atorvastatin (LIPITOR) 40 mg, Oral, Daily    calcium carbonate/vitamin D3 (CALTRATE 600 + D ORAL) Oral, Daily    cholecalciferol (vitamin D3) (VITAMIN D3) 1,000 Units, Oral, Daily    coenzyme Q10 200 mg, Oral, Daily    diclofenac sodium (VOLTAREN) 2 g, Topical (Top), 4 times daily    ELIQUIS 5 mg, Oral, 2 times daily    esomeprazole (NEXIUM) 40 mg, Oral    felodipine (PLENDIL) 10 mg, Oral, BP > 160/90    felodipine (PLENDIL) 5 mg, Oral, Nightly    fluticasone propionate (FLONASE) 50 mcg, Each Nostril, Daily    multivitamin with folic acid 400 mcg Tab 1 tablet, Oral, Daily    omega-3 acid ethyl esters (LOVAZA) 2 g, Oral, Daily    solifenacin (VESICARE) 5 mg, Oral, As needed (PRN)    sotaloL (BETAPACE) 120 mg, Oral, 2 times daily    tamsulosin (FLOMAX) 0.4 mg Cap 1 capsule, Oral, Daily        Review of Systems   12 point review of systems conducted, negative except as stated in the history of present illness. See HPI for details.      OBJECTIVE:     Physical Examination:    Visit Vitals  BP (!) 166/94 (BP Location: Right arm, Patient Position: Sitting)   Pulse 60   Resp 16   Ht 5' 4" (1.626 m)   Wt 73 kg (161 lb)   BMI 27.64 kg/m²    "     General:  Pleasant, Well-nourished, Well-groomed.    CV:  Neck is supple.  There are no carotid bruits.  Heart has regular rate and rhythm.    Lungs:  Lungs are clear to auscultation bilaterally with non-labored respirations.    Abdomen:  Soft, non-tender, non-distended.    Musculoskeletal:  Cervical ROM:  Mildly limited with bilateral rotation.  Tinel's is negative at bilateral wrist and elbows.  Spurling's maneuver is negative bilaterally.  Straight leg raise is negative bilaterally.  Crossed straight leg raise is negative bilaterally.  Hip rotation is negative bilaterally.    Neurological:    Alert and oriented to person, place, time, and situation.  Muscle strength against resistance:  Strength  Deltoids Triceps Biceps Wrist Extension Wrist Flexion Intrinsics   Upper: R 5/5 5/5 5/5 5/5  5/5    L 5/5 5/5 5/5 5/5  5/5     Iliopsoas Quadriceps Knee  Flexion Tibialis  anterior Gastro- cnemius EHL   Lower: R 5/5 5/5 5/5 5/5 5/5 5/5    L 5/5 5/5 5/5 5/5 5/5 5/5   Sensation is intact to primary modalities in bilateral upper extremities.  Reflexes:   Right Left   Triceps 2+ 1+   Biceps 1+ 1+   Brachioradialis 0 0   Patellar 2+ 2+   Achilles 1+ 1+   Bhat's sign is negative bilaterally.  Toes are down going to Babinski.  There is no clonus at the ankles.  Gait is normal.   She was able to walk on heels and toes without difficulty.    Imaging:  All pertinent neuroimaging independently reviewed. Discussed these findings in detail with the patient.      ASSESSMENT:     1. Cervical spondylosis with radiculopathy  Ambulatory referral/consult to Physical/Occupational Therapy      2. Lumbar degenerative disc disease  Ambulatory referral/consult to Physical/Occupational Therapy      3. DDD (degenerative disc disease), thoracic  Ambulatory referral/consult to Physical/Occupational Therapy      4. Scoliosis of lumbosacral region due to degenerative disease of spine in adult        5. Nonintractable headache, unspecified  chronicity pattern, unspecified headache type  Ambulatory referral/consult to Neurology          PLAN:     Options were discussed at length with the patient, her , and her daughter.  She will undergo a course of physical therapy.  We will refer the patient to Dr. Worthy for treatment of her headaches.  She will return for follow-up after therapy.      A total of 44 minutes was spent face-to-face with the patient during this encounter.  Over half of that time was spent on counseling and coordination of care.  An additional 10+ minutes were used to reviewed the patient's chart, cervical MRI images and report, and work on office note.      Kathleen Ferreira PA-C

## 2023-06-05 ENCOUNTER — OFFICE VISIT (OUTPATIENT)
Dept: NEUROLOGY | Facility: CLINIC | Age: 78
End: 2023-06-05
Payer: MEDICARE

## 2023-06-05 VITALS
HEART RATE: 65 BPM | BODY MASS INDEX: 27.83 KG/M2 | DIASTOLIC BLOOD PRESSURE: 95 MMHG | SYSTOLIC BLOOD PRESSURE: 170 MMHG | HEIGHT: 64 IN | WEIGHT: 163 LBS

## 2023-06-05 DIAGNOSIS — R51.9 NONINTRACTABLE HEADACHE, UNSPECIFIED CHRONICITY PATTERN, UNSPECIFIED HEADACHE TYPE: ICD-10-CM

## 2023-06-05 DIAGNOSIS — R51.9 NEW ONSET OF HEADACHES AFTER AGE 50: Primary | ICD-10-CM

## 2023-06-05 DIAGNOSIS — G43.909 MIGRAINE WITHOUT STATUS MIGRAINOSUS, NOT INTRACTABLE, UNSPECIFIED MIGRAINE TYPE: ICD-10-CM

## 2023-06-05 PROCEDURE — 3077F PR MOST RECENT SYSTOLIC BLOOD PRESSURE >= 140 MM HG: ICD-10-PCS | Mod: CPTII,S$GLB,, | Performed by: NURSE PRACTITIONER

## 2023-06-05 PROCEDURE — 99999 PR PBB SHADOW E&M-EST. PATIENT-LVL IV: CPT | Mod: PBBFAC,,, | Performed by: NURSE PRACTITIONER

## 2023-06-05 PROCEDURE — 3080F DIAST BP >= 90 MM HG: CPT | Mod: CPTII,S$GLB,, | Performed by: NURSE PRACTITIONER

## 2023-06-05 PROCEDURE — 99215 OFFICE O/P EST HI 40 MIN: CPT | Mod: S$GLB,,, | Performed by: NURSE PRACTITIONER

## 2023-06-05 PROCEDURE — 1160F RVW MEDS BY RX/DR IN RCRD: CPT | Mod: CPTII,S$GLB,, | Performed by: NURSE PRACTITIONER

## 2023-06-05 PROCEDURE — 99215 PR OFFICE/OUTPT VISIT, EST, LEVL V, 40-54 MIN: ICD-10-PCS | Mod: S$GLB,,, | Performed by: NURSE PRACTITIONER

## 2023-06-05 PROCEDURE — 1101F PR PT FALLS ASSESS DOC 0-1 FALLS W/OUT INJ PAST YR: ICD-10-PCS | Mod: CPTII,S$GLB,, | Performed by: NURSE PRACTITIONER

## 2023-06-05 PROCEDURE — 99999 PR PBB SHADOW E&M-EST. PATIENT-LVL IV: ICD-10-PCS | Mod: PBBFAC,,, | Performed by: NURSE PRACTITIONER

## 2023-06-05 PROCEDURE — 3077F SYST BP >= 140 MM HG: CPT | Mod: CPTII,S$GLB,, | Performed by: NURSE PRACTITIONER

## 2023-06-05 PROCEDURE — 1159F MED LIST DOCD IN RCRD: CPT | Mod: CPTII,S$GLB,, | Performed by: NURSE PRACTITIONER

## 2023-06-05 PROCEDURE — 1101F PT FALLS ASSESS-DOCD LE1/YR: CPT | Mod: CPTII,S$GLB,, | Performed by: NURSE PRACTITIONER

## 2023-06-05 PROCEDURE — 3080F PR MOST RECENT DIASTOLIC BLOOD PRESSURE >= 90 MM HG: ICD-10-PCS | Mod: CPTII,S$GLB,, | Performed by: NURSE PRACTITIONER

## 2023-06-05 PROCEDURE — 3288F FALL RISK ASSESSMENT DOCD: CPT | Mod: CPTII,S$GLB,, | Performed by: NURSE PRACTITIONER

## 2023-06-05 PROCEDURE — 1159F PR MEDICATION LIST DOCUMENTED IN MEDICAL RECORD: ICD-10-PCS | Mod: CPTII,S$GLB,, | Performed by: NURSE PRACTITIONER

## 2023-06-05 PROCEDURE — 3288F PR FALLS RISK ASSESSMENT DOCUMENTED: ICD-10-PCS | Mod: CPTII,S$GLB,, | Performed by: NURSE PRACTITIONER

## 2023-06-05 PROCEDURE — 1160F PR REVIEW ALL MEDS BY PRESCRIBER/CLIN PHARMACIST DOCUMENTED: ICD-10-PCS | Mod: CPTII,S$GLB,, | Performed by: NURSE PRACTITIONER

## 2023-06-05 RX ORDER — RIMEGEPANT SULFATE 75 MG/75MG
75 TABLET, ORALLY DISINTEGRATING ORAL DAILY PRN
Qty: 8 TABLET | Refills: 1 | Status: SHIPPED | OUTPATIENT
Start: 2023-06-05

## 2023-06-05 NOTE — ASSESSMENT & PLAN NOTE
-headaches started immediately after being switched from Eliquis to Xarelto.  Once switched back to Eliquis, headaches significantly improved, but they are still occurring 1 to 2 times a month.  They have migrainous features so will try to treat migraine headache.  Triptans are contraindicated with her history of stroke so Rx for Nurtec  -if Nurtec not cost effective, could try a migraine cocktail with 1000 mg Tylenol in lieu of Toradol.  Need to avoid NSAIDs given use of Eliquis

## 2023-06-05 NOTE — PROGRESS NOTES
Subjective:       Patient ID: Adrianna Mars is a 78 y.o. female.    Chief Complaint: Headache (NP: Pt referred by VIVEK St for neuro consult to evaluate for headaches; Pt states HA's started about a year ago after diagnosis of allergy to xarelto pain location to left temporal area and eye along with blurred vision to left peripheral)      History of Present Illness:  New patient referred by VIVEK Catalan for headaches.  70-year-old woman who started having headaches 1-1/2 years ago.  She says the head pain immediately started after switching from Eliquis to Xarelto.  She is on blood thinner medication for a history of AFib and stroke.  Her stroke occurred in 2016.  Four days after switching from Eliquis to Xarelto, she had onset of severe head pain to the point that she could not even turn her neck.  She says the head pain started in the left side of her neck and traveled up to her head.  It was a sharp debilitating pain.  She was seen in ED and they did a workup for stroke which was reportedly normal.  She was ultimately switched back to Eliquis and the headaches began to dissipate.  Unfortunately, though, she still has periodic headaches that are much less severe in nature.  About 1 to 2 times a month, she will begin to have pain to the left frontal region around her eyebrow.  She says it is an aching pain that starts out dull, but then increases to a very severe amount of pain.  It can last for half a day up to 2 days.  She has associated photophobia and nausea with the headaches.  The only thing she has taken for the headache is extra-strength Tylenol which sometimes dulls the pain, but certainly does not relieve the pain.  She says prior to the onset of the head pain a year and a half ago, she was never a headache or migraine person.    She has a history of right-sided stroke in 2016.  She was given tPA at all.  Her symptoms were aphasia and left arm weakness.  After receiving the tPA, she says she  had complete resolution of her stroke symptoms.    MRI brain 04/2016 Focal right insular increased diffusion signal without convincing ADC signal dropout. There is corresponding FLAIR signal hyperintensity in this region. Given the location, ADC signal dropout may be difficult to distinguish dose focal acute insular cortical infarct is considered    CT head 02/2022 unremarkable      Review of Systems  I have reviewed a 12 point review of systems which is scanned into the chart        Past Medical History:   Diagnosis Date    A-fib 06/14/2022    Age-related nuclear cataract of both eyes 06/14/2022    Arthritis 06/14/2022    Bilateral sensorineural hearing loss 06/14/2022    Cerebrovascular accident (CVA) due to stenosis of precerebral artery 04/06/2016    Cervicalgia 08/05/2022    Chronic heart failure with preserved ejection fraction 06/14/2022    Diverticulosis 06/14/2022    HLD (hyperlipidemia) 06/14/2022    Hypertension 06/14/2022    Ischemic cerebrovascular accident (CVA) 06/14/2022    Kidney carcinoma, left 03/15/2022    Lumbar radiculopathy     CADY on CPAP 03/15/2022       Past Surgical History:   Procedure Laterality Date    APPENDECTOMY      CARDIOVERSION      HIP REPLACEMENT ARTHROPLASTY Right 2017    HYSTERECTOMY      KIDNEY SURGERY Left 2015    excision of tumor.    SHOULDER SURGERY Bilateral 2013        Family History   Problem Relation Age of Onset    Stroke Mother     Prostate cancer Father     Cancer Brother         Social History     Socioeconomic History    Marital status:    Tobacco Use    Smoking status: Never    Smokeless tobacco: Never        Outpatient Encounter Medications as of 6/5/2023   Medication Sig Dispense Refill    atorvastatin (LIPITOR) 40 MG tablet Take 40 mg by mouth once daily.      calcium carbonate/vitamin D3 (CALTRATE 600 + D ORAL) Take by mouth Daily.      coenzyme Q10 100 mg capsule Take 200 mg by mouth Daily.      ELIQUIS 5 mg Tab Take 5 mg by mouth 2 (two) times  "daily.      esomeprazole (NEXIUM) 40 MG capsule Take 40 mg by mouth as needed.      felodipine (PLENDIL) 5 MG 24 hr tablet Take 5 mg by mouth every evening.      fluticasone propionate (FLONASE) 50 mcg/actuation nasal spray 1 spray (50 mcg total) by Each Nostril route once daily. (Patient taking differently: 1 spray by Each Nostril route as needed.) 16 g 0    multivitamin with folic acid 400 mcg Tab Take 1 tablet by mouth once daily.      solifenacin (VESICARE) 10 MG tablet Take 5 mg by mouth as needed.      sotaloL (BETAPACE) 120 MG Tab Take 120 mg by mouth 2 (two) times daily.      tamsulosin (FLOMAX) 0.4 mg Cap Take 1 capsule by mouth once daily.      omega-3 acid ethyl esters (LOVAZA) 1 gram capsule Take 2 g by mouth once daily.      rimegepant (NURTEC) 75 mg odt Take 1 tablet (75 mg total) by mouth daily as needed for Migraine. Place ODT tablet on the tongue; alternatively the ODT tablet may be placed under the tongue 8 tablet 1    [DISCONTINUED] cholecalciferol, vitamin D3, (VITAMIN D3) 25 mcg (1,000 unit) capsule Take 1,000 Units by mouth once daily.      [DISCONTINUED] diclofenac sodium (VOLTAREN) 1 % Gel Apply 2 g topically 4 (four) times daily. 200 g 5    [DISCONTINUED] felodipine (PLENDIL) 10 MG 24 hr tablet Take 10 mg by mouth. BP > 160/90       No facility-administered encounter medications on file as of 6/5/2023.      Objective:   BP (!) 170/95   Pulse 65   Ht 5' 4" (1.626 m)   Wt 73.9 kg (163 lb)   BMI 27.98 kg/m²        Physical Exam  General:  Alert and oriented  NAD  No overt edema    Cognition and Comprehension:  Speech and language intact  Follows commands    Cranial nerves:   CN 2_ Visual fields (full to confrontation both eyes)  CN 3, 4, 6_ Intact, ANAM, no nystagmus  CN 5_facial tactile sensation intact  CN 7_no face asymmetry; normal eye closure and smile  CN 8_hearing intact to spoken voice  CN 9, 10, 11_voice normal, shoulder shrug ok; deltoids not fatigable   CN 12 tongue_protrudes " mid line    Muscle Strength and Tone:  Normal upper extremity tone  Normal lower extremity tone  Normal upper extremity strength, LUE drift  Normal lower extremity strength    Reflexes:  Normal and symmetric    Sensation:  Intact to light touch and temperature    Coordination and Gait:  Coordination and gait are normal   No ataxia with FTN or HKS      Assessment & Plan:      1. New onset of headaches after age 50  Assessment & Plan:  -order for MRI brain and sed rate    Orders:  -     MRI Brain W WO Contrast; Future; Expected date: 06/05/2023  -     Sedimentation rate; Future; Expected date: 06/05/2023  -     Creatinine, serum; Future; Expected date: 06/05/2023    2. Migraine without status migrainosus, not intractable, unspecified migraine type  Assessment & Plan:  -headaches started immediately after being switched from Eliquis to Xarelto.  Once switched back to Eliquis, headaches significantly improved, but they are still occurring 1 to 2 times a month.  They have migrainous features so will try to treat migraine headache.  Triptans are contraindicated with her history of stroke so Rx for Nurtec  -if Nurtec not cost effective, could try a migraine cocktail with 1000 mg Tylenol in lieu of Toradol.  Need to avoid NSAIDs given use of Eliquis    Orders:  -     rimegepant (NURTEC) 75 mg odt; Take 1 tablet (75 mg total) by mouth daily as needed for Migraine. Place ODT tablet on the tongue; alternatively the ODT tablet may be placed under the tongue  Dispense: 8 tablet; Refill: 1    3. Nonintractable headache, unspecified chronicity pattern, unspecified headache type  -     Ambulatory referral/consult to Neurology      RTC 3 mos with Dr. Worthy    Prior to the patient's arrival on the same day, I spent 10 minutes reviewing chart. Once in the exam room with the patient, I spent 50 minutes in the room with the member performing a history and exam as well as reviewing the test results and recommendations with the patient.  After leaving the exam room, I spend an additional 10 minutes completing the electronic health record. The total time spent  that day caring for the member is 70 minutes, and this time--including the breakdown--is documented in the medical record.     This note was created with the assistance of voice recognition software. There may be transcription errors as a result of using this technology however minimal. Effort has been made to assure accuracy of transcription but any obvious errors or omissions should be clarified with the author of the document.

## 2023-06-19 ENCOUNTER — TELEPHONE (OUTPATIENT)
Dept: NEUROLOGY | Facility: CLINIC | Age: 78
End: 2023-06-19
Payer: MEDICARE

## 2023-06-19 NOTE — TELEPHONE ENCOUNTER
Patient was informed. I will contact [facility] imaging to mail a copy of the disc.

## 2023-06-19 NOTE — TELEPHONE ENCOUNTER
Please apologize to her for the delay.  I am wanting to get the actual images for review.  With NANDA, I can only see the report.  It looks like her MRI brain is overall stable and just shows old findings that we already knew about.  I will confirm that once I can actually get access to the images themselves.  Certainly nothing concerning structurally that would be contributing to her migraines

## 2023-06-19 NOTE — TELEPHONE ENCOUNTER
Pt states she has done her MRI.   Is asking can she discuss MRI results before her NOV due to concern about cause of migraines.    LOV: 6/5/23    NOV: 9/7/23

## 2023-06-19 NOTE — TELEPHONE ENCOUNTER
Informed we did not receive results of MRI. I will contact [facility]  Spoke with Mikayla at [facility]. States she will fax over results to us.

## 2023-06-19 NOTE — TELEPHONE ENCOUNTER
[facility] [place], spoke to Ana M concerning mailing the disc. States she will put it in the mail

## 2023-06-29 ENCOUNTER — TELEPHONE (OUTPATIENT)
Dept: NEUROLOGY | Facility: CLINIC | Age: 78
End: 2023-06-29
Payer: MEDICARE

## 2023-06-29 NOTE — TELEPHONE ENCOUNTER
----- Message from MILDRED Park sent at 6/19/2023  3:20 PM CDT -----  Can you get me code to review the images  ----- Message -----  From: Brittany Palm MA  Sent: 6/15/2023   2:49 PM CDT  To: MILDRED Park

## 2023-07-17 ENCOUNTER — OFFICE VISIT (OUTPATIENT)
Dept: NEUROSURGERY | Facility: CLINIC | Age: 78
End: 2023-07-17
Payer: MEDICARE

## 2023-07-17 VITALS
WEIGHT: 163 LBS | BODY MASS INDEX: 27.83 KG/M2 | RESPIRATION RATE: 16 BRPM | HEIGHT: 64 IN | HEART RATE: 62 BPM | DIASTOLIC BLOOD PRESSURE: 86 MMHG | SYSTOLIC BLOOD PRESSURE: 146 MMHG

## 2023-07-17 DIAGNOSIS — M51.36 LUMBAR DEGENERATIVE DISC DISEASE: Primary | ICD-10-CM

## 2023-07-17 DIAGNOSIS — M51.34 DDD (DEGENERATIVE DISC DISEASE), THORACIC: ICD-10-CM

## 2023-07-17 PROCEDURE — 1125F PR PAIN SEVERITY QUANTIFIED, PAIN PRESENT: ICD-10-PCS | Mod: CPTII,,, | Performed by: PHYSICIAN ASSISTANT

## 2023-07-17 PROCEDURE — 3288F FALL RISK ASSESSMENT DOCD: CPT | Mod: CPTII,,, | Performed by: PHYSICIAN ASSISTANT

## 2023-07-17 PROCEDURE — 1159F MED LIST DOCD IN RCRD: CPT | Mod: CPTII,,, | Performed by: PHYSICIAN ASSISTANT

## 2023-07-17 PROCEDURE — 3079F DIAST BP 80-89 MM HG: CPT | Mod: CPTII,,, | Performed by: PHYSICIAN ASSISTANT

## 2023-07-17 PROCEDURE — 1125F AMNT PAIN NOTED PAIN PRSNT: CPT | Mod: CPTII,,, | Performed by: PHYSICIAN ASSISTANT

## 2023-07-17 PROCEDURE — 3079F PR MOST RECENT DIASTOLIC BLOOD PRESSURE 80-89 MM HG: ICD-10-PCS | Mod: CPTII,,, | Performed by: PHYSICIAN ASSISTANT

## 2023-07-17 PROCEDURE — 1160F RVW MEDS BY RX/DR IN RCRD: CPT | Mod: CPTII,,, | Performed by: PHYSICIAN ASSISTANT

## 2023-07-17 PROCEDURE — 1159F PR MEDICATION LIST DOCUMENTED IN MEDICAL RECORD: ICD-10-PCS | Mod: CPTII,,, | Performed by: PHYSICIAN ASSISTANT

## 2023-07-17 PROCEDURE — 99213 PR OFFICE/OUTPT VISIT, EST, LEVL III, 20-29 MIN: ICD-10-PCS | Mod: ,,, | Performed by: PHYSICIAN ASSISTANT

## 2023-07-17 PROCEDURE — 1101F PR PT FALLS ASSESS DOC 0-1 FALLS W/OUT INJ PAST YR: ICD-10-PCS | Mod: CPTII,,, | Performed by: PHYSICIAN ASSISTANT

## 2023-07-17 PROCEDURE — 99213 OFFICE O/P EST LOW 20 MIN: CPT | Mod: ,,, | Performed by: PHYSICIAN ASSISTANT

## 2023-07-17 PROCEDURE — 3077F SYST BP >= 140 MM HG: CPT | Mod: CPTII,,, | Performed by: PHYSICIAN ASSISTANT

## 2023-07-17 PROCEDURE — 1160F PR REVIEW ALL MEDS BY PRESCRIBER/CLIN PHARMACIST DOCUMENTED: ICD-10-PCS | Mod: CPTII,,, | Performed by: PHYSICIAN ASSISTANT

## 2023-07-17 PROCEDURE — 3288F PR FALLS RISK ASSESSMENT DOCUMENTED: ICD-10-PCS | Mod: CPTII,,, | Performed by: PHYSICIAN ASSISTANT

## 2023-07-17 PROCEDURE — 3077F PR MOST RECENT SYSTOLIC BLOOD PRESSURE >= 140 MM HG: ICD-10-PCS | Mod: CPTII,,, | Performed by: PHYSICIAN ASSISTANT

## 2023-07-17 PROCEDURE — 1101F PT FALLS ASSESS-DOCD LE1/YR: CPT | Mod: CPTII,,, | Performed by: PHYSICIAN ASSISTANT

## 2023-07-17 NOTE — PROGRESS NOTES
[facility]  History & Physical  Neurosurgery      Adrianna Mars   [id number]   1945       SUBJECTIVE:     CHIEF COMPLAINT:  Headaches and lower back pain      HPI:  Adrianna Mars is a 78 y.o. female who presents for follow up appointment after a course of physical therapy.  Since she was last seen, her headaches are not as severe, but have been persistent.  He has been seen by Kristina Delgado, nurse practitioner with neurology.  MRI of the brain was obtained on 06/05/2023 showing age-related changes.  She has completed physical therapy.  They mostly worked on the lower back.  She does not feel as though it changed her symptoms much.  She is able to participate in all activities she desires.  At the end of the day with resting, the lower back stiffens, becomes painful, and pain radiates into the right lateral thigh.  In addition, she continues with mid back pain especially with washing dishes.  She is learning her limitations in regards to her activity.        Past Medical History:   Diagnosis Date    A-fib 06/14/2022    Age-related nuclear cataract of both eyes 06/14/2022    Arthritis 06/14/2022    Bilateral sensorineural hearing loss 06/14/2022    Cerebrovascular accident (CVA) due to stenosis of precerebral artery 04/06/2016    Cervicalgia 08/05/2022    Chronic heart failure with preserved ejection fraction 06/14/2022    Diverticulosis 06/14/2022    HLD (hyperlipidemia) 06/14/2022    Hypertension 06/14/2022    Ischemic cerebrovascular accident (CVA) 06/14/2022    Kidney carcinoma, left 03/15/2022    Lumbar radiculopathy     CADY on CPAP 03/15/2022       Past Surgical History:   Procedure Laterality Date    APPENDECTOMY      CARDIOVERSION      HIP REPLACEMENT ARTHROPLASTY Right 2017    HYSTERECTOMY      KIDNEY SURGERY Left 2015    excision of tumor.    SHOULDER SURGERY Bilateral 2013       Family History   Problem Relation Age of Onset    Stroke Mother     Prostate cancer Father     Cancer  Brother        Social History     Socioeconomic History    Marital status:    Tobacco Use    Smoking status: Never    Smokeless tobacco: Never       Review of patient's allergies indicates:   Allergen Reactions    Metoprolol succinate     Penicillins     Rivaroxaban         Current Outpatient Medications   Medication Instructions    atorvastatin (LIPITOR) 40 mg, Oral, Daily    calcium carbonate/vitamin D3 (CALTRATE 600 + D ORAL) Oral, Daily    coenzyme Q10 200 mg, Oral, Daily    ELIQUIS 5 mg, Oral, 2 times daily    esomeprazole (NEXIUM) 40 mg, Oral, As needed (PRN)    felodipine (PLENDIL) 5 mg, Oral, Nightly    fluticasone propionate (FLONASE) 50 mcg, Each Nostril, Daily    multivitamin with folic acid 400 mcg Tab 1 tablet, Oral, Daily    NURTEC 75 mg, Oral, Daily PRN, Place ODT tablet on the tongue; alternatively the ODT tablet may be placed under the tongue    omega-3 acid ethyl esters (LOVAZA) 2 g, Oral, Daily    solifenacin (VESICARE) 5 mg, Oral, As needed (PRN)    sotaloL (BETAPACE) 120 mg, Oral, 2 times daily    tamsulosin (FLOMAX) 0.4 mg Cap 1 capsule, Oral, Daily        Review of Systems:    Review of Systems   Constitutional:  Negative for chills and fever.   HENT:  Negative for nosebleeds and sore throat.    Eyes:  Positive for visual disturbance. Negative for pain.   Respiratory:  Negative for cough, chest tightness and shortness of breath.    Cardiovascular:  Negative for chest pain.   Gastrointestinal:  Negative for diarrhea, nausea and vomiting.   Genitourinary:  Negative for difficulty urinating, dysuria and hematuria.   Musculoskeletal:  Positive for back pain. Negative for gait problem and myalgias.   Skin:  Negative for rash.   Neurological:  Positive for headaches. Negative for dizziness and facial asymmetry.   Psychiatric/Behavioral:  Negative for confusion and sleep disturbance. The patient is not nervous/anxious.         OBJECTIVE:       Visit Vitals  BP (!) 146/86 (BP Location: Right  "arm, Patient Position: Sitting)   Pulse 62   Resp 16   Ht 5' 4" (1.626 m)   Wt 73.9 kg (163 lb)   BMI 27.98 kg/m²        General:  Pleasant, Well-nourished, Well-groomed.    Lungs:  Breathing is quiet with non-labored respirations.    Neurological:    Alert and oriented to person, place, time, and situation.  Her memory, cognition, and affect are appropriate.  Speech is fluent.  She is moving all extremities well.  Gait is normal.       Imaging:  All pertinent neuroimaging independently reviewed. Discussed these findings in detail with the patient.      ASSESSMENT:     1. Lumbar degenerative disc disease        2. DDD (degenerative disc disease), thoracic           PLAN:     Overall, the patient has improved.  She has learned to work within her limitations.  She will return for follow-up on an as-needed basis.      A total of 21 minutes was spent face-to-face with the patient during this encounter.  Over half of that time was spent on counseling and coordination of care.  Additional time was used to reviewed the patient's chart, MRI brain images and report, cervical and lumbar x-ray images, neurology note, and work on office note.      Kathleen Ferreira PA-C    "

## 2023-08-03 PROBLEM — G43.909 MIGRAINE WITHOUT STATUS MIGRAINOSUS, NOT INTRACTABLE: Chronic | Status: ACTIVE | Noted: 2023-06-05

## 2023-08-03 PROBLEM — R51.9 NEW ONSET OF HEADACHES AFTER AGE 50: Chronic | Status: ACTIVE | Noted: 2023-06-05

## 2023-09-07 ENCOUNTER — OFFICE VISIT (OUTPATIENT)
Dept: NEUROLOGY | Facility: CLINIC | Age: 78
End: 2023-09-07
Payer: MEDICARE

## 2023-09-07 ENCOUNTER — TELEPHONE (OUTPATIENT)
Dept: NEUROLOGY | Facility: CLINIC | Age: 78
End: 2023-09-07

## 2023-09-07 VITALS
WEIGHT: 162 LBS | BODY MASS INDEX: 27.66 KG/M2 | DIASTOLIC BLOOD PRESSURE: 92 MMHG | SYSTOLIC BLOOD PRESSURE: 124 MMHG | HEIGHT: 64 IN

## 2023-09-07 DIAGNOSIS — R51.9 NEW ONSET OF HEADACHES AFTER AGE 50: Primary | ICD-10-CM

## 2023-09-07 PROCEDURE — 1159F PR MEDICATION LIST DOCUMENTED IN MEDICAL RECORD: ICD-10-PCS | Mod: CPTII,S$GLB,, | Performed by: PSYCHIATRY & NEUROLOGY

## 2023-09-07 PROCEDURE — 3074F PR MOST RECENT SYSTOLIC BLOOD PRESSURE < 130 MM HG: ICD-10-PCS | Mod: CPTII,S$GLB,, | Performed by: PSYCHIATRY & NEUROLOGY

## 2023-09-07 PROCEDURE — 1160F RVW MEDS BY RX/DR IN RCRD: CPT | Mod: CPTII,S$GLB,, | Performed by: PSYCHIATRY & NEUROLOGY

## 2023-09-07 PROCEDURE — 3080F DIAST BP >= 90 MM HG: CPT | Mod: CPTII,S$GLB,, | Performed by: PSYCHIATRY & NEUROLOGY

## 2023-09-07 PROCEDURE — 3288F PR FALLS RISK ASSESSMENT DOCUMENTED: ICD-10-PCS | Mod: CPTII,S$GLB,, | Performed by: PSYCHIATRY & NEUROLOGY

## 2023-09-07 PROCEDURE — 1101F PR PT FALLS ASSESS DOC 0-1 FALLS W/OUT INJ PAST YR: ICD-10-PCS | Mod: CPTII,S$GLB,, | Performed by: PSYCHIATRY & NEUROLOGY

## 2023-09-07 PROCEDURE — 99213 OFFICE O/P EST LOW 20 MIN: CPT | Mod: S$GLB,,, | Performed by: PSYCHIATRY & NEUROLOGY

## 2023-09-07 PROCEDURE — 3080F PR MOST RECENT DIASTOLIC BLOOD PRESSURE >= 90 MM HG: ICD-10-PCS | Mod: CPTII,S$GLB,, | Performed by: PSYCHIATRY & NEUROLOGY

## 2023-09-07 PROCEDURE — 99999 PR PBB SHADOW E&M-EST. PATIENT-LVL III: CPT | Mod: PBBFAC,,, | Performed by: PSYCHIATRY & NEUROLOGY

## 2023-09-07 PROCEDURE — 1159F MED LIST DOCD IN RCRD: CPT | Mod: CPTII,S$GLB,, | Performed by: PSYCHIATRY & NEUROLOGY

## 2023-09-07 PROCEDURE — 3074F SYST BP LT 130 MM HG: CPT | Mod: CPTII,S$GLB,, | Performed by: PSYCHIATRY & NEUROLOGY

## 2023-09-07 PROCEDURE — 1160F PR REVIEW ALL MEDS BY PRESCRIBER/CLIN PHARMACIST DOCUMENTED: ICD-10-PCS | Mod: CPTII,S$GLB,, | Performed by: PSYCHIATRY & NEUROLOGY

## 2023-09-07 PROCEDURE — 1101F PT FALLS ASSESS-DOCD LE1/YR: CPT | Mod: CPTII,S$GLB,, | Performed by: PSYCHIATRY & NEUROLOGY

## 2023-09-07 PROCEDURE — 99213 PR OFFICE/OUTPT VISIT, EST, LEVL III, 20-29 MIN: ICD-10-PCS | Mod: S$GLB,,, | Performed by: PSYCHIATRY & NEUROLOGY

## 2023-09-07 PROCEDURE — 99999 PR PBB SHADOW E&M-EST. PATIENT-LVL III: ICD-10-PCS | Mod: PBBFAC,,, | Performed by: PSYCHIATRY & NEUROLOGY

## 2023-09-07 PROCEDURE — 3288F FALL RISK ASSESSMENT DOCD: CPT | Mod: CPTII,S$GLB,, | Performed by: PSYCHIATRY & NEUROLOGY

## 2023-09-07 NOTE — PROGRESS NOTES
Chief Complaint   Patient presents with    Migraine     3 month follow up        This is a 78 y.o. female here for follow up for headaches. Headaches are overall better, MRI negative for secondary causes for HA. Sed rate not done. Has only had to take nurtec once and it took 1 hour to relieve pain. Overall did not like the way it made her feel. HA is dull and retroorbital on left, has cataract surgery on left and headaches did start after that has scar tissues in left temporal region. Also recall thought xarelto triggered HA as well now back on Eliquis,     Medication List with Changes/Refills   Current Medications    ATORVASTATIN (LIPITOR) 40 MG TABLET    Take 40 mg by mouth once daily.    CALCIUM CARBONATE/VITAMIN D3 (CALTRATE 600 + D ORAL)    Take by mouth Daily.    COENZYME Q10 100 MG CAPSULE    Take 200 mg by mouth Daily.    ELIQUIS 5 MG TAB    Take 5 mg by mouth 2 (two) times daily.    ESOMEPRAZOLE (NEXIUM) 40 MG CAPSULE    Take 40 mg by mouth as needed.    FELODIPINE (PLENDIL) 5 MG 24 HR TABLET    Take 5 mg by mouth every evening.    FLUTICASONE PROPIONATE (FLONASE) 50 MCG/ACTUATION NASAL SPRAY    1 spray (50 mcg total) by Each Nostril route once daily.    MULTIVITAMIN WITH FOLIC ACID 400 MCG TAB    Take 1 tablet by mouth once daily.    RIMEGEPANT (NURTEC) 75 MG ODT    Take 1 tablet (75 mg total) by mouth daily as needed for Migraine. Place ODT tablet on the tongue; alternatively the ODT tablet may be placed under the tongue    SOLIFENACIN (VESICARE) 10 MG TABLET    Take 5 mg by mouth as needed.    SOTALOL (BETAPACE) 120 MG TAB    Take 120 mg by mouth 2 (two) times daily.    TAMSULOSIN (FLOMAX) 0.4 MG CAP    Take 1 capsule by mouth once daily.        Vitals:    09/07/23 0827   BP: (!) 124/92        NAD  Alert and oriented  Cognition and perception intact  No aphasia  EOMI  No facial asymmetry  No dysarthria  Moves all extremities symmetrically  No gross coordination abnormalities  Gait normal      1. New  onset of headaches after age 50  -     Sedimentation rate; Future; Expected date: 09/07/2023       MRI negative for stroke, mass. Check sed rate

## 2023-09-07 NOTE — TELEPHONE ENCOUNTER
----- Message from Yomaira Worthy MD sent at 9/7/2023 11:20 AM CDT -----  Her sed rate was slightly elevated but not in the range where we would worry about temporal arteritis. We can continue with the plan of care as discussed at the visit but call us with any worsening of headaches

## 2023-09-07 NOTE — TELEPHONE ENCOUNTER
Pt informed sed rate slightly elevated but not in the range to cause concern about temporal arteritis can continue with plan of care as discussed at visit but to please call with any worsening of headaches